# Patient Record
Sex: MALE | Race: ASIAN | URBAN - METROPOLITAN AREA
[De-identification: names, ages, dates, MRNs, and addresses within clinical notes are randomized per-mention and may not be internally consistent; named-entity substitution may affect disease eponyms.]

---

## 2020-04-06 ENCOUNTER — IMPORTED ENCOUNTER (OUTPATIENT)
Dept: URBAN - METROPOLITAN AREA CLINIC 1 | Facility: CLINIC | Age: 75
End: 2020-04-06

## 2020-04-06 PROBLEM — H40.013: Noted: 2020-04-06

## 2020-04-06 PROBLEM — H25.813: Noted: 2020-04-06

## 2020-04-06 PROBLEM — H43.813: Noted: 2020-04-06

## 2020-04-06 PROBLEM — H16.143: Noted: 2020-04-06

## 2020-04-06 PROBLEM — H04.123: Noted: 2020-04-06

## 2020-04-06 PROCEDURE — 92015 DETERMINE REFRACTIVE STATE: CPT

## 2020-04-06 PROCEDURE — 92250 FUNDUS PHOTOGRAPHY W/I&R: CPT

## 2020-04-06 PROCEDURE — 92004 COMPRE OPH EXAM NEW PT 1/>: CPT

## 2020-04-06 NOTE — PATIENT DISCUSSION
1.  PVD w/o Tear OU -- No holes tears or detachments noted 360 on dilated exam. Patient was cautioned to call our office immediately if they experience a substantial change in their symptoms such as an increase in floaters persistent flashes loss of visual field (may appear as a shadow or a curtain) or decrease in visual acuity as these may indicate a retinal tear or detachment. 2.  Glaucoma Suspect OU (0.50/0.65) -- IOP today 24/23. Disc photos done today show cupping. (-)Fam Hx. Patient is considered Low Risk. Condition was discussed with patient and patient understands. Will continue to monitor patient for any progression in condition. Patient was advised to call us with any problems questions or concerns. 3.  Cataract OU -- Observe for now without intervention. 4.  ROCHELLE w/ PEK OU -- Recommend ATs BID OU routinely. Sample of Blink given. 5. Dermatochalasis RUL -- Observe. Mrx finalized. Letter to PCP. Return for an appointment in 4 month 10/OCT/VF 24-2 with Dr. Jessica Georges.

## 2020-08-12 ENCOUNTER — IMPORTED ENCOUNTER (OUTPATIENT)
Dept: URBAN - METROPOLITAN AREA CLINIC 1 | Facility: CLINIC | Age: 75
End: 2020-08-12

## 2020-08-12 PROBLEM — H25.813: Noted: 2020-08-12

## 2020-08-12 PROBLEM — H40.013: Noted: 2020-08-12

## 2020-08-12 PROCEDURE — 92133 CPTRZD OPH DX IMG PST SGM ON: CPT

## 2020-08-12 PROCEDURE — 92083 EXTENDED VISUAL FIELD XM: CPT

## 2020-08-12 PROCEDURE — 92012 INTRM OPH EXAM EST PATIENT: CPT

## 2020-08-12 NOTE — PATIENT DISCUSSION
1.  Glaucoma Suspect OU (0.50/0.65) -- IOP today 23/22. OCT done today w/ Superior thinning OU. HVF done today w/ none specific loss OU and WNL. (-)Fam Hx. Patient is considered Low Risk. Condition was discussed with patient and patient understands. Will continue to monitor patient for any progression in condition. Patient was advised to call us with any problems questions or concerns. 2.  Cataract OU -- Observe for now without intervention. 3.  ROCHELLE w/ PEK OU -- Recommend ATs BID OU routinely. Sample of Blink given. 4. Dermatochalasis RUL  5.  H/o PVD w/o Tear OU -- No holes tears or detachments noted 360 on dilated exam. Patient was cautioned to call our office immediately if they experience a substantial change in their symptoms such as an increase in floaters persistent flashes loss of visual field (may appear as a shadow or a curtain) or decrease in visual acuity as these may indicate a retinal tear or detachment. Return for an appointment in April 30/OCT with Dr. Gertrudis Lopez.

## 2021-04-14 ENCOUNTER — IMPORTED ENCOUNTER (OUTPATIENT)
Dept: URBAN - METROPOLITAN AREA CLINIC 1 | Facility: CLINIC | Age: 76
End: 2021-04-14

## 2021-04-14 PROBLEM — H25.813: Noted: 2021-04-14

## 2021-04-14 PROBLEM — H40.013: Noted: 2021-04-14

## 2021-04-14 PROBLEM — H04.123: Noted: 2021-04-14

## 2021-04-14 PROBLEM — H16.143: Noted: 2021-04-14

## 2021-04-14 PROCEDURE — 99214 OFFICE O/P EST MOD 30 MIN: CPT

## 2021-04-14 PROCEDURE — 92133 CPTRZD OPH DX IMG PST SGM ON: CPT

## 2021-04-14 PROCEDURE — 92015 DETERMINE REFRACTIVE STATE: CPT

## 2021-04-14 NOTE — PATIENT DISCUSSION
1.  Glaucoma Suspect OU (0.50/0.65) -- IOP 19/20 today. OCT shows no progression OD WNL OS. (-)Fam Hx. Patient is considered Low Risk. Condition was discussed with patient and patient understands. Will continue to monitor patient for any progression in condition. Patient was advised to call us with any problems questions or concerns. 2.  Cataract OU -- Nonsurgical at this time. Observe for now without intervention. 3.  ROCHELLE w/ PEK OU -- Recommend ATs BID OU routinely. 4.  Dermatochalasis RUL  - Observe with no intervention at this time. 5. PVD w/o Tear OU -- Stable. RD precautions. MRX for glasses given. Return for an appointment in 1 year 30/OCT with Dr. Jessica Georges.

## 2021-04-28 NOTE — PATIENT DISCUSSION
Discussed with the patient the predictability of visual outcome after cataract surgery is more difficult in previous refractive surgery patients. The patient is at greater risk for the need of enhancement to achieve desired visual outcome.
Follow for increased symptoms.
Follow with Dr. Azra Chadwick
Good postoperative appearance.
Limited prognosis with vision post cataract surgery understood and accepted.
Monitor.
Recommended observation.
Attending Only

## 2022-04-02 ASSESSMENT — VISUAL ACUITY
OS_CC: 20/30+2
OD_CC: J1
OS_CC: J1
OS_GLARE: 20/60
OD_CC: J1
OD_SC: 20/25-2
OD_CC: 20/40
OS_SC: 20/20
OS_GLARE: 20/60
OD_CC: J1
OS_SC: 20/25
OD_GLARE: 20/60
OD_SC: 20/20
OD_SC: 20/25
OS_SC: 20/25-1
OS_CC: J1
OD_GLARE: 20/60
OS_CC: J1

## 2022-04-02 ASSESSMENT — TONOMETRY
OS_IOP_MMHG: 22
OD_IOP_MMHG: 19
OS_IOP_MMHG: 23
OD_IOP_MMHG: 23
OD_IOP_MMHG: 24
OS_IOP_MMHG: 20

## 2022-04-13 ENCOUNTER — COMPREHENSIVE EXAM (OUTPATIENT)
Dept: URBAN - METROPOLITAN AREA CLINIC 1 | Facility: CLINIC | Age: 77
End: 2022-04-13

## 2022-04-13 DIAGNOSIS — H43.813: ICD-10-CM

## 2022-04-13 DIAGNOSIS — H25.813: ICD-10-CM

## 2022-04-13 DIAGNOSIS — H40.013: ICD-10-CM

## 2022-04-13 DIAGNOSIS — H04.123: ICD-10-CM

## 2022-04-13 DIAGNOSIS — H16.143: ICD-10-CM

## 2022-04-13 PROCEDURE — 99214 OFFICE O/P EST MOD 30 MIN: CPT

## 2022-04-13 PROCEDURE — 92015 DETERMINE REFRACTIVE STATE: CPT

## 2022-04-13 PROCEDURE — 92133 CPTRZD OPH DX IMG PST SGM ON: CPT

## 2022-04-13 ASSESSMENT — TONOMETRY
OS_IOP_MMHG: 20
OD_IOP_MMHG: 20

## 2022-04-13 ASSESSMENT — VISUAL ACUITY
OD_CC: 20/25-2
OS_BAT: 20/70
OD_BAT: 20/70
OS_CC: 20/20

## 2022-04-13 NOTE — PATIENT DISCUSSION
(CD .50/.65) OCT WNL. IOP Stable. - Family Hx. Repeat OCT Every other year. Patient is considered low risk. Condition was discussed with patient and patient understands. Will continue to monitor patient for any progression in condition. Patient was advised to call us with any problems, questions, or concerns.

## 2022-10-14 NOTE — PATIENT DISCUSSION
Pt. 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

## 2022-10-14 NOTE — PATIENT DISCUSSION
"Pt. elects ANANDA Carey OS only (Pseudophakic OD).  Pt. understands that he will need to wear glasses at all times PO until final treatment is performed and Rx is ""locked in"". "

## 2022-12-08 NOTE — PROCEDURE NOTE: SURGICAL
<p>Prior to commencing surgery patient identification, surgical procedure, site, and side were confirmed by Dr. Jaleel Pitts. Following topical proparacaine anesthesia, the patient was positioned at the YAG laser, a contact lens coupled to the cornea with methylcellulose and an axial posterior capsulotomy performed without complication using 3.2 Mj x 14. Excess methylcellulose was washed from the eye, one drop of Alphagan was instilled and the patient returned to the holding area having tolerated the procedure well and without complication. </p><p> [id number]</p>

## 2023-07-07 ENCOUNTER — COMPREHENSIVE EXAM (OUTPATIENT)
Dept: URBAN - METROPOLITAN AREA CLINIC 1 | Facility: CLINIC | Age: 78
End: 2023-07-07

## 2023-07-07 DIAGNOSIS — H40.013: ICD-10-CM

## 2023-07-07 DIAGNOSIS — H16.143: ICD-10-CM

## 2023-07-07 DIAGNOSIS — H04.123: ICD-10-CM

## 2023-07-07 DIAGNOSIS — H43.813: ICD-10-CM

## 2023-07-07 DIAGNOSIS — H02.834: ICD-10-CM

## 2023-07-07 DIAGNOSIS — H02.831: ICD-10-CM

## 2023-07-07 DIAGNOSIS — H25.813: ICD-10-CM

## 2023-07-07 PROCEDURE — 92133 CPTRZD OPH DX IMG PST SGM ON: CPT

## 2023-07-07 PROCEDURE — 99214 OFFICE O/P EST MOD 30 MIN: CPT

## 2023-07-07 ASSESSMENT — VISUAL ACUITY
OD_SC: J1+
OD_SC: 20/25
OS_SC: 20/25-1
OS_SC: J1+

## 2023-07-07 ASSESSMENT — TONOMETRY
OD_IOP_MMHG: 28
OS_IOP_MMHG: 20

## 2024-01-10 ENCOUNTER — FOLLOW UP (OUTPATIENT)
Dept: URBAN - METROPOLITAN AREA CLINIC 1 | Facility: CLINIC | Age: 79
End: 2024-01-10

## 2024-01-10 DIAGNOSIS — H40.013: ICD-10-CM

## 2024-01-10 PROCEDURE — 92083 EXTENDED VISUAL FIELD XM: CPT

## 2024-01-10 PROCEDURE — 99213 OFFICE O/P EST LOW 20 MIN: CPT

## 2024-01-10 ASSESSMENT — TONOMETRY
OD_IOP_MMHG: 18
OS_IOP_MMHG: 18

## 2024-01-10 ASSESSMENT — VISUAL ACUITY
OS_SC: J3
OD_SC: J3
OD_SC: 20/20
OS_SC: 20/25

## 2024-07-15 ENCOUNTER — COMPREHENSIVE EXAM (OUTPATIENT)
Dept: URBAN - METROPOLITAN AREA CLINIC 1 | Facility: CLINIC | Age: 79
End: 2024-07-15

## 2024-07-15 DIAGNOSIS — H02.831: ICD-10-CM

## 2024-07-15 DIAGNOSIS — H43.813: ICD-10-CM

## 2024-07-15 DIAGNOSIS — H04.123: ICD-10-CM

## 2024-07-15 DIAGNOSIS — H16.143: ICD-10-CM

## 2024-07-15 DIAGNOSIS — H25.813: ICD-10-CM

## 2024-07-15 DIAGNOSIS — H40.013: ICD-10-CM

## 2024-07-15 DIAGNOSIS — H02.834: ICD-10-CM

## 2024-07-15 PROCEDURE — 99214 OFFICE O/P EST MOD 30 MIN: CPT

## 2024-07-15 PROCEDURE — 92015 DETERMINE REFRACTIVE STATE: CPT

## 2024-07-15 ASSESSMENT — VISUAL ACUITY
OS_SC: J3
OS_SC: 20/25-1
OD_SC: 20/25
OS_BAT: 20/30
OD_SC: J3
OD_BAT: 20/30

## 2024-07-15 ASSESSMENT — TONOMETRY
OS_IOP_MMHG: 17
OD_IOP_MMHG: 17